# Patient Record
Sex: FEMALE | Race: WHITE | NOT HISPANIC OR LATINO | Employment: UNEMPLOYED | URBAN - NONMETROPOLITAN AREA
[De-identification: names, ages, dates, MRNs, and addresses within clinical notes are randomized per-mention and may not be internally consistent; named-entity substitution may affect disease eponyms.]

---

## 2023-12-11 ENCOUNTER — OFFICE VISIT (OUTPATIENT)
Dept: PRIMARY CARE | Facility: CLINIC | Age: 63
End: 2023-12-11
Payer: COMMERCIAL

## 2023-12-11 VITALS
HEIGHT: 66 IN | HEART RATE: 74 BPM | OXYGEN SATURATION: 98 % | BODY MASS INDEX: 23.63 KG/M2 | DIASTOLIC BLOOD PRESSURE: 60 MMHG | SYSTOLIC BLOOD PRESSURE: 124 MMHG | WEIGHT: 147 LBS

## 2023-12-11 DIAGNOSIS — Z13.6 ENCOUNTER FOR LIPID SCREENING FOR CARDIOVASCULAR DISEASE: ICD-10-CM

## 2023-12-11 DIAGNOSIS — Z13.220 ENCOUNTER FOR LIPID SCREENING FOR CARDIOVASCULAR DISEASE: ICD-10-CM

## 2023-12-11 DIAGNOSIS — D72.819 LEUKOPENIA, UNSPECIFIED TYPE: ICD-10-CM

## 2023-12-11 DIAGNOSIS — Z00.00 WELL ADULT EXAM: Primary | ICD-10-CM

## 2023-12-11 DIAGNOSIS — Z12.31 ENCOUNTER FOR SCREENING MAMMOGRAM FOR BREAST CANCER: ICD-10-CM

## 2023-12-11 DIAGNOSIS — N18.31 STAGE 3A CHRONIC KIDNEY DISEASE (MULTI): ICD-10-CM

## 2023-12-11 DIAGNOSIS — Z13.29 SCREENING FOR THYROID DISORDER: ICD-10-CM

## 2023-12-11 DIAGNOSIS — E55.9 VITAMIN D DEFICIENCY: ICD-10-CM

## 2023-12-11 PROBLEM — N28.9 RENAL IMPAIRMENT: Status: ACTIVE | Noted: 2023-12-11

## 2023-12-11 PROBLEM — R19.4 CHANGE IN BOWEL HABIT: Status: RESOLVED | Noted: 2023-12-11 | Resolved: 2023-12-11

## 2023-12-11 PROBLEM — R59.0 AXILLARY LYMPHADENOPATHY: Status: RESOLVED | Noted: 2023-12-11 | Resolved: 2023-12-11

## 2023-12-11 PROBLEM — R47.89 GARBLED SPEECH: Status: RESOLVED | Noted: 2023-12-11 | Resolved: 2023-12-11

## 2023-12-11 PROBLEM — G89.29 CHRONIC BILATERAL LOW BACK PAIN WITHOUT SCIATICA: Status: ACTIVE | Noted: 2023-12-11

## 2023-12-11 PROBLEM — R92.8 ABNORMAL MAMMOGRAM OF BOTH BREASTS: Status: RESOLVED | Noted: 2023-12-11 | Resolved: 2023-12-11

## 2023-12-11 PROBLEM — K58.9 IRRITABLE BOWEL SYNDROME: Status: ACTIVE | Noted: 2023-12-11

## 2023-12-11 PROBLEM — M54.50 CHRONIC BILATERAL LOW BACK PAIN WITHOUT SCIATICA: Status: ACTIVE | Noted: 2023-12-11

## 2023-12-11 PROBLEM — R25.2 MUSCLE CRAMPS: Status: RESOLVED | Noted: 2023-12-11 | Resolved: 2023-12-11

## 2023-12-11 LAB
25(OH)D3 SERPL-MCNC: 38 NG/ML (ref 30–100)
ALBUMIN SERPL BCP-MCNC: 4.6 G/DL (ref 3.4–5)
ALP SERPL-CCNC: 44 U/L (ref 33–136)
ALT SERPL W P-5'-P-CCNC: 9 U/L (ref 7–45)
ANION GAP SERPL CALC-SCNC: 13 MMOL/L (ref 10–20)
AST SERPL W P-5'-P-CCNC: 19 U/L (ref 9–39)
BASOPHILS # BLD AUTO: 0.02 X10*3/UL (ref 0–0.1)
BASOPHILS NFR BLD AUTO: 0.4 %
BILIRUB SERPL-MCNC: 0.7 MG/DL (ref 0–1.2)
BUN SERPL-MCNC: 11 MG/DL (ref 6–23)
CALCIUM SERPL-MCNC: 9.3 MG/DL (ref 8.6–10.3)
CHLORIDE SERPL-SCNC: 105 MMOL/L (ref 98–107)
CHOLEST SERPL-MCNC: 189 MG/DL (ref 0–199)
CHOLESTEROL/HDL RATIO: 3
CO2 SERPL-SCNC: 27 MMOL/L (ref 21–32)
CREAT SERPL-MCNC: 1.04 MG/DL (ref 0.5–1.05)
CREAT UR-MCNC: 124.8 MG/DL (ref 20–320)
EOSINOPHIL # BLD AUTO: 0.1 X10*3/UL (ref 0–0.7)
EOSINOPHIL NFR BLD AUTO: 1.9 %
ERYTHROCYTE [DISTWIDTH] IN BLOOD BY AUTOMATED COUNT: 12.4 % (ref 11.5–14.5)
GFR SERPL CREATININE-BSD FRML MDRD: 60 ML/MIN/1.73M*2
GLUCOSE SERPL-MCNC: 94 MG/DL (ref 74–99)
HCT VFR BLD AUTO: 42.8 % (ref 36–46)
HDLC SERPL-MCNC: 63.2 MG/DL
HGB BLD-MCNC: 13.6 G/DL (ref 12–16)
IMM GRANULOCYTES # BLD AUTO: 0.01 X10*3/UL (ref 0–0.7)
IMM GRANULOCYTES NFR BLD AUTO: 0.2 % (ref 0–0.9)
LDLC SERPL CALC-MCNC: 109 MG/DL
LYMPHOCYTES # BLD AUTO: 1.82 X10*3/UL (ref 1.2–4.8)
LYMPHOCYTES NFR BLD AUTO: 34.6 %
MAGNESIUM SERPL-MCNC: 1.94 MG/DL (ref 1.6–2.4)
MCH RBC QN AUTO: 30.2 PG (ref 26–34)
MCHC RBC AUTO-ENTMCNC: 31.8 G/DL (ref 32–36)
MCV RBC AUTO: 95 FL (ref 80–100)
MONOCYTES # BLD AUTO: 0.23 X10*3/UL (ref 0.1–1)
MONOCYTES NFR BLD AUTO: 4.4 %
NEUTROPHILS # BLD AUTO: 3.08 X10*3/UL (ref 1.2–7.7)
NEUTROPHILS NFR BLD AUTO: 58.5 %
NON HDL CHOLESTEROL: 126 MG/DL (ref 0–149)
NRBC BLD-RTO: 0 /100 WBCS (ref 0–0)
PLATELET # BLD AUTO: 243 X10*3/UL (ref 150–450)
POC APPEARANCE, URINE: CLEAR
POC BILIRUBIN, URINE: NEGATIVE
POC BLOOD, URINE: NEGATIVE
POC COLOR, URINE: YELLOW
POC GLUCOSE, URINE: NEGATIVE MG/DL
POC KETONES, URINE: NEGATIVE MG/DL
POC LEUKOCYTES, URINE: ABNORMAL
POC NITRITE,URINE: NEGATIVE
POC PH, URINE: 6.5 PH
POC PROTEIN, URINE: NEGATIVE MG/DL
POC SPECIFIC GRAVITY, URINE: 1.02
POC UROBILINOGEN, URINE: 0.2 EU/DL
POTASSIUM SERPL-SCNC: 4.6 MMOL/L (ref 3.5–5.3)
PROT SERPL-MCNC: 6.7 G/DL (ref 6.4–8.2)
PROT UR-ACNC: 9 MG/DL (ref 5–24)
PROT/CREAT UR: 0.07 MG/MG CREAT (ref 0–0.17)
RBC # BLD AUTO: 4.5 X10*6/UL (ref 4–5.2)
SODIUM SERPL-SCNC: 140 MMOL/L (ref 136–145)
TRIGL SERPL-MCNC: 85 MG/DL (ref 0–149)
TSH SERPL-ACNC: 2.46 MIU/L (ref 0.44–3.98)
VLDL: 17 MG/DL (ref 0–40)
WBC # BLD AUTO: 5.3 X10*3/UL (ref 4.4–11.3)

## 2023-12-11 PROCEDURE — 81003 URINALYSIS AUTO W/O SCOPE: CPT | Performed by: FAMILY MEDICINE

## 2023-12-11 PROCEDURE — 80061 LIPID PANEL: CPT

## 2023-12-11 PROCEDURE — 1036F TOBACCO NON-USER: CPT | Performed by: FAMILY MEDICINE

## 2023-12-11 PROCEDURE — 82570 ASSAY OF URINE CREATININE: CPT

## 2023-12-11 PROCEDURE — 83735 ASSAY OF MAGNESIUM: CPT

## 2023-12-11 PROCEDURE — 80053 COMPREHEN METABOLIC PANEL: CPT

## 2023-12-11 PROCEDURE — 82306 VITAMIN D 25 HYDROXY: CPT

## 2023-12-11 PROCEDURE — 85025 COMPLETE CBC W/AUTO DIFF WBC: CPT

## 2023-12-11 PROCEDURE — 36415 COLL VENOUS BLD VENIPUNCTURE: CPT

## 2023-12-11 PROCEDURE — 99396 PREV VISIT EST AGE 40-64: CPT | Performed by: FAMILY MEDICINE

## 2023-12-11 PROCEDURE — 84443 ASSAY THYROID STIM HORMONE: CPT

## 2023-12-11 PROCEDURE — 84156 ASSAY OF PROTEIN URINE: CPT

## 2023-12-11 RX ORDER — ASCORBIC ACID 500 MG
500 TABLET ORAL DAILY
COMMUNITY
Start: 2023-04-10

## 2023-12-11 RX ORDER — CALCIUM CARBONATE 300MG(750)
400 TABLET,CHEWABLE ORAL DAILY
COMMUNITY
Start: 2023-02-09

## 2023-12-11 RX ORDER — CHOLECALCIFEROL (VITAMIN D3) 25 MCG
1000 TABLET ORAL DAILY
COMMUNITY

## 2023-12-11 ASSESSMENT — ENCOUNTER SYMPTOMS
DIZZINESS: 0
DIARRHEA: 0
BACK PAIN: 0
DYSPHORIC MOOD: 0
NERVOUS/ANXIOUS: 0
COUGH: 0
EYE REDNESS: 0
MYALGIAS: 1
EYE PAIN: 0
HEADACHES: 0
HEMATURIA: 0
TREMORS: 0
WHEEZING: 0
WEAKNESS: 0
POLYPHAGIA: 0
SHORTNESS OF BREATH: 0
SORE THROAT: 0
FREQUENCY: 0
ABDOMINAL PAIN: 0
PALPITATIONS: 0
ADENOPATHY: 0
POLYDIPSIA: 0
BLOOD IN STOOL: 0
NUMBNESS: 0
BRUISES/BLEEDS EASILY: 0
CHEST TIGHTNESS: 0
DYSURIA: 0
CONSTIPATION: 0
COLOR CHANGE: 0
ARTHRALGIAS: 0
FATIGUE: 0
NAUSEA: 0
FEVER: 0
TROUBLE SWALLOWING: 0
VOMITING: 0
CHILLS: 0

## 2023-12-11 NOTE — PROGRESS NOTES
Subjective   Patient ID: Jenni Potter is a 63 y.o. female who presents for Annual Exam (Check up ).  HPI  Has a lot of pain at times  Occurs in various places  Seems to come and go    No CP, SOB, palpitations, numbness, weakness, dizziness, HA, vision changes  Will get some muscle cramps at time    Ophtho- Summer 2023  Dentist- Regularly sees  [place]-  Colonoscopy- 9/21/20- repeat 10 years  [facility]-  FOBT-  UA/Micro- ordered  Mammo- ordered  DEXA-  PAP- will contact Dr. Jason  Lung CT-  Coronary Calcium CT Score-  AAA-  EKG-  RSV-  Prevnar-  Flu-refused  Shingrix-  Td- 2010  Hep C-  Advance Directives-      Current Outpatient Medications:     ascorbic acid (Vitamin C) 500 mg tablet, Take 1 tablet (500 mg) by mouth once daily., Disp: , Rfl:     magnesium oxide (Mag-Ox) 400 mg tablet, Take 1 tablet (400 mg) by mouth once daily., Disp: , Rfl:     BACILLUS COAGULANS-INULIN ORAL, Take 1 capsule by mouth once daily., Disp: , Rfl:     cholecalciferol (Vitamin D-3) 25 MCG (1000 UT) tablet, Take 1 tablet (1,000 Units) by mouth once daily., Disp: , Rfl:    Past Surgical History:   Procedure Laterality Date    BREAST LUMPECTOMY  06/18/2013    Breast Surgery Lumpectomy    COLONOSCOPY  02/02/2017    Complete Colonoscopy      Past Medical History:   Diagnosis Date    Axillary lymphadenopathy 12/11/2023    Change in bowel habit 12/11/2023    Dysuria 10/22/2015    Dysuria    Encounter for gynecological examination (general) (routine) without abnormal findings     Encounter for routine pelvic examination    Garbled speech 12/11/2023    Low back pain, unspecified 02/02/2017    Acute low back pain    Lumbago with sciatica, left side 02/02/2017    Acute left-sided low back pain with left-sided sciatica    Other injury of unspecified body region, initial encounter 06/09/2016    Wound, open    Other shoulder lesions, left shoulder 12/30/2016    Tendinitis of left rotator cuff    Pain in left shoulder 12/30/2016    Acute pain of left  "shoulder    Personal history of urinary (tract) infections 01/14/2017    History of acute cystitis     Social History     Tobacco Use    Smoking status: Never    Smokeless tobacco: Never   Substance Use Topics    Drug use: Never      No family history on file.   Review of Systems   Constitutional:  Negative for chills, fatigue and fever.   HENT:  Negative for congestion, ear discharge, ear pain, hearing loss, nosebleeds, sore throat, tinnitus and trouble swallowing.    Eyes:  Negative for pain, redness and visual disturbance.   Respiratory:  Negative for cough, chest tightness, shortness of breath and wheezing.    Cardiovascular:  Negative for chest pain, palpitations and leg swelling.   Gastrointestinal:  Negative for abdominal pain, blood in stool, constipation, diarrhea, nausea and vomiting.   Endocrine: Negative for cold intolerance, heat intolerance, polydipsia, polyphagia and polyuria.   Genitourinary:  Negative for dysuria, frequency, hematuria and urgency.   Musculoskeletal:  Positive for myalgias. Negative for arthralgias, back pain and gait problem.   Skin:  Negative for color change and rash.   Neurological:  Negative for dizziness, tremors, syncope, weakness, numbness and headaches.   Hematological:  Negative for adenopathy. Does not bruise/bleed easily.   Psychiatric/Behavioral:  Negative for dysphoric mood. The patient is not nervous/anxious.        Objective   /60   Pulse 74   Ht 1.676 m (5' 6\")   Wt 66.7 kg (147 lb)   SpO2 98%   BMI 23.73 kg/m²    Physical Exam  Vitals and nursing note reviewed.   Constitutional:       General: She is not in acute distress.     Appearance: Normal appearance.   HENT:      Head: Normocephalic and atraumatic.      Right Ear: Tympanic membrane, ear canal and external ear normal.      Left Ear: Tympanic membrane, ear canal and external ear normal.      Nose: Nose normal.      Mouth/Throat:      Mouth: Mucous membranes are moist.      Pharynx: Oropharynx is " clear.   Eyes:      Extraocular Movements: Extraocular movements intact.      Conjunctiva/sclera: Conjunctivae normal.      Pupils: Pupils are equal, round, and reactive to light.   Neck:      Vascular: No carotid bruit.   Cardiovascular:      Rate and Rhythm: Normal rate and regular rhythm.      Pulses: Normal pulses.      Heart sounds: Normal heart sounds. No murmur heard.  Pulmonary:      Effort: Pulmonary effort is normal.      Breath sounds: Normal breath sounds. No wheezing, rhonchi or rales.   Abdominal:      General: Abdomen is flat. Bowel sounds are normal.      Palpations: Abdomen is soft. There is no mass.   Musculoskeletal:         General: Normal range of motion.      Cervical back: Normal range of motion and neck supple.   Lymphadenopathy:      Cervical: No cervical adenopathy.   Skin:     Capillary Refill: Capillary refill takes less than 2 seconds.   Neurological:      General: No focal deficit present.      Mental Status: She is alert and oriented to person, place, and time.      Cranial Nerves: No cranial nerve deficit.      Motor: No weakness.      Deep Tendon Reflexes: Reflexes normal.   Psychiatric:         Mood and Affect: Mood normal.         Behavior: Behavior normal.         Assessment/Plan   Problem List Items Addressed This Visit       Leukopenia    Relevant Orders    CBC and Auto Differential (Completed)    Comprehensive Metabolic Panel (Completed)    Stage 3a chronic kidney disease (CMS/HCC)    Relevant Orders    Comprehensive Metabolic Panel (Completed)    POCT UA Automated manually resulted (Completed)    Protein, Urine Random (Completed)    Magnesium (Completed)    Vitamin D deficiency    Relevant Orders    Comprehensive Metabolic Panel (Completed)    Vitamin D 25-Hydroxy,Total (for eval of Vitamin D levels) (Completed)     Other Visit Diagnoses       Well adult exam    -  Primary    Encounter for lipid screening for cardiovascular disease        Relevant Orders    Lipid Panel  (Completed)    Screening for thyroid disorder        Relevant Orders    TSH with reflex to Free T4 if abnormal (Completed)    Encounter for screening mammogram for breast cancer        Relevant Orders    BI mammo bilateral screening tomosynthesis            Patient understands and agrees with treatment plan    Christopher Orellana, DO

## 2024-01-09 ENCOUNTER — DOCUMENTATION (OUTPATIENT)
Dept: SURGERY | Facility: CLINIC | Age: 64
End: 2024-01-09
Payer: COMMERCIAL

## 2025-03-01 ENCOUNTER — OFFICE VISIT (OUTPATIENT)
Dept: URGENT CARE | Age: 65
End: 2025-03-01
Payer: MEDICARE

## 2025-03-01 ENCOUNTER — HOSPITAL ENCOUNTER (EMERGENCY)
Facility: HOSPITAL | Age: 65
Discharge: HOME | End: 2025-03-01
Attending: EMERGENCY MEDICINE
Payer: MEDICARE

## 2025-03-01 VITALS
SYSTOLIC BLOOD PRESSURE: 121 MMHG | DIASTOLIC BLOOD PRESSURE: 81 MMHG | HEART RATE: 81 BPM | RESPIRATION RATE: 19 BRPM | BODY MASS INDEX: 24.21 KG/M2 | TEMPERATURE: 96.9 F | WEIGHT: 150 LBS | OXYGEN SATURATION: 98 %

## 2025-03-01 VITALS
RESPIRATION RATE: 16 BRPM | BODY MASS INDEX: 23.54 KG/M2 | TEMPERATURE: 97 F | OXYGEN SATURATION: 99 % | WEIGHT: 150 LBS | HEIGHT: 67 IN | DIASTOLIC BLOOD PRESSURE: 73 MMHG | SYSTOLIC BLOOD PRESSURE: 128 MMHG | HEART RATE: 63 BPM

## 2025-03-01 DIAGNOSIS — H10.31 ACUTE CONJUNCTIVITIS OF RIGHT EYE, UNSPECIFIED ACUTE CONJUNCTIVITIS TYPE: Primary | ICD-10-CM

## 2025-03-01 DIAGNOSIS — R21 RASH: ICD-10-CM

## 2025-03-01 DIAGNOSIS — H57.11 ACUTE RIGHT EYE PAIN: Primary | ICD-10-CM

## 2025-03-01 PROCEDURE — 2500000005 HC RX 250 GENERAL PHARMACY W/O HCPCS

## 2025-03-01 PROCEDURE — 99283 EMERGENCY DEPT VISIT LOW MDM: CPT | Performed by: EMERGENCY MEDICINE

## 2025-03-01 PROCEDURE — 99284 EMERGENCY DEPT VISIT MOD MDM: CPT

## 2025-03-01 RX ORDER — TETRACAINE HYDROCHLORIDE 5 MG/ML
1 SOLUTION OPHTHALMIC ONCE
Status: COMPLETED | OUTPATIENT
Start: 2025-03-01 | End: 2025-03-01

## 2025-03-01 RX ORDER — ERYTHROMYCIN 5 MG/G
OINTMENT OPHTHALMIC NIGHTLY
Qty: 10 G | Refills: 0 | Status: SHIPPED | OUTPATIENT
Start: 2025-03-01 | End: 2025-03-06

## 2025-03-01 RX ADMIN — FLUORESCEIN SODIUM 1 STRIP: 1 STRIP OPHTHALMIC at 17:18

## 2025-03-01 RX ADMIN — TETRACAINE HYDROCHLORIDE 1 DROP: 5 SOLUTION OPHTHALMIC at 17:18

## 2025-03-01 ASSESSMENT — LIFESTYLE VARIABLES
HAVE YOU EVER FELT YOU SHOULD CUT DOWN ON YOUR DRINKING: NO
EVER FELT BAD OR GUILTY ABOUT YOUR DRINKING: NO
EVER HAD A DRINK FIRST THING IN THE MORNING TO STEADY YOUR NERVES TO GET RID OF A HANGOVER: NO
TOTAL SCORE: 0
HAVE PEOPLE ANNOYED YOU BY CRITICIZING YOUR DRINKING: NO

## 2025-03-01 ASSESSMENT — VISUAL ACUITY
OD_CC: 20/20
OS_CC: 20/13
OU: 1

## 2025-03-01 ASSESSMENT — PAIN - FUNCTIONAL ASSESSMENT: PAIN_FUNCTIONAL_ASSESSMENT: 0-10

## 2025-03-01 ASSESSMENT — COLUMBIA-SUICIDE SEVERITY RATING SCALE - C-SSRS
2. HAVE YOU ACTUALLY HAD ANY THOUGHTS OF KILLING YOURSELF?: NO
6. HAVE YOU EVER DONE ANYTHING, STARTED TO DO ANYTHING, OR PREPARED TO DO ANYTHING TO END YOUR LIFE?: NO
1. IN THE PAST MONTH, HAVE YOU WISHED YOU WERE DEAD OR WISHED YOU COULD GO TO SLEEP AND NOT WAKE UP?: NO

## 2025-03-01 ASSESSMENT — PAIN SCALES - GENERAL: PAINLEVEL_OUTOF10: 1

## 2025-03-01 NOTE — DISCHARGE INSTRUCTIONS
Believe most likely you are experiencing pinkeye in the right eye at this time.  Given your history we are giving you antibiotic drops to prevent any bacterial infection from developing.  Watch for any new signs or worsening symptoms.

## 2025-03-01 NOTE — ED TRIAGE NOTES
Pt presents to ED from a [facility] with possible shingles in her right eye. Pt reports itching, swelling, rash to lower lid, HA and light sensitivity. Onset on Friday

## 2025-03-01 NOTE — PROGRESS NOTES
Subjective   Patient ID: Jenni Potter is a 64 y.o. female. They present today with a chief complaint of Eye Problem (Started Friday, eye itchiness, and redness, she believes it may be shingles.).    History of Present Illness  Reports history of having shingles in left eye and is concerned that she has shingles, symptoms are similar.  Has few lesions on forehead that she picked because she thought were pimples.  Reports that previously she did have few lesions under right eye.  Denies vision changes, no drainage, states eye and eyelid feels uncomfortable      Eye Problem      Past Medical History  Allergies as of 03/01/2025    (No Known Allergies)       (Not in a hospital admission)       Past Medical History:   Diagnosis Date    Axillary lymphadenopathy 12/11/2023    Change in bowel habit 12/11/2023    Dysuria 10/22/2015    Dysuria    Encounter for gynecological examination (general) (routine) without abnormal findings     Encounter for routine pelvic examination    Garbled speech 12/11/2023    Low back pain, unspecified 02/02/2017    Acute low back pain    Lumbago with sciatica, left side 02/02/2017    Acute left-sided low back pain with left-sided sciatica    Other injury of unspecified body region, initial encounter 06/09/2016    Wound, open    Other shoulder lesions, left shoulder 12/30/2016    Tendinitis of left rotator cuff    Pain in left shoulder 12/30/2016    Acute pain of left shoulder    Personal history of urinary (tract) infections 01/14/2017    History of acute cystitis       Past Surgical History:   Procedure Laterality Date    BREAST LUMPECTOMY  06/18/2013    Breast Surgery Lumpectomy    COLONOSCOPY  02/02/2017    Complete Colonoscopy        reports that she has never smoked. She has never used smokeless tobacco. She reports that she does not use drugs.    Review of Systems  Review of Systems                               Objective    Vitals:    03/01/25 1533   BP: 121/81   BP Location: Left arm    Patient Position: Sitting   BP Cuff Size: Small adult   Pulse: 81   Resp: 19   Temp: 36.1 °C (96.9 °F)   TempSrc: Oral   SpO2: 98%   Weight: 68 kg (150 lb)     No LMP recorded.    Physical Exam  Constitutional:       Appearance: Normal appearance.   Eyes:      General: Vision grossly intact. Gaze aligned appropriately.      Extraocular Movements: Extraocular movements intact.      Conjunctiva/sclera:      Right eye: Right conjunctiva is injected. No exudate.     Pupils: Pupils are equal, round, and reactive to light.   Cardiovascular:      Rate and Rhythm: Normal rate and regular rhythm.      Pulses: Normal pulses.      Heart sounds: Normal heart sounds.   Pulmonary:      Effort: Pulmonary effort is normal.      Breath sounds: Normal breath sounds.   Skin:     Findings: Rash (3 sm papular lesions right forehead, sl swollen right eyelid) present.   Neurological:      General: No focal deficit present.      Mental Status: She is oriented to person, place, and time.   Psychiatric:         Mood and Affect: Mood normal.         Behavior: Behavior normal.         Thought Content: Thought content normal.         Judgment: Judgment normal.         Procedures    Point of Care Test & Imaging Results from this visit  No results found for this visit on 03/01/25.   No results found.    Diagnostic study results (if any) were reviewed by JOE Clancy.    Assessment/Plan   Allergies, medications, history, and pertinent labs/EKGs/Imaging reviewed by JOE Clancy.     Medical Decision Making  Right eye irritation, few scratched lesions right forehead, pt has history of shingles in left eye and is concerned that she has shingles in right eye, symptoms similar.    No vision changes  Advised that she needs Urgent Ophthalmology evaluation, since it is the weekend the only option is for her to go to [facility] Emergency room.  Dr Gutierrez notified     Orders and Diagnoses  Diagnoses and all orders for this  visit:  Acute right eye pain  Rash      Medical Admin Record      Patient disposition: ED    Electronically signed by JOE Clancy  4:06 PM

## 2025-03-01 NOTE — ED PROVIDER NOTES
HPI   Chief Complaint   Patient presents with    Eye Problem       64-year-old female past medical history CKD stage III, ocular shingles presents emergency department chief complaints of right eye issue.  Patient reports pruritus, edema, rash to lower right eyelid, headache, light sensitivity beginning yesterday.  Patient reports that she has a history of shingles in the left eye and states that her symptoms currently feel similar to her previous episode.  Denies ear pain, ear drainage, nausea, vomiting, chest pain, shortness of breath, abdominal pain, acute vision changes, pain with extraocular movements, eye discharge.              Patient History   Past Medical History:   Diagnosis Date    Axillary lymphadenopathy 12/11/2023    Change in bowel habit 12/11/2023    Dysuria 10/22/2015    Dysuria    Encounter for gynecological examination (general) (routine) without abnormal findings     Encounter for routine pelvic examination    Garbled speech 12/11/2023    Low back pain, unspecified 02/02/2017    Acute low back pain    Lumbago with sciatica, left side 02/02/2017    Acute left-sided low back pain with left-sided sciatica    Other injury of unspecified body region, initial encounter 06/09/2016    Wound, open    Other shoulder lesions, left shoulder 12/30/2016    Tendinitis of left rotator cuff    Pain in left shoulder 12/30/2016    Acute pain of left shoulder    Personal history of urinary (tract) infections 01/14/2017    History of acute cystitis     Past Surgical History:   Procedure Laterality Date    BREAST LUMPECTOMY  06/18/2013    Breast Surgery Lumpectomy    COLONOSCOPY  02/02/2017    Complete Colonoscopy     No family history on file.  Social History     Tobacco Use    Smoking status: Never    Smokeless tobacco: Never   Substance Use Topics    Alcohol use: Not on file     Comment: social    Drug use: Never       Physical Exam   ED Triage Vitals [03/01/25 1654]   Temperature Heart Rate Respirations BP   36.1  °C (97 °F) 63 16 128/73      Pulse Ox Temp Source Heart Rate Source Patient Position   99 % Temporal Monitor Sitting      BP Location FiO2 (%)     Left arm --       Physical Exam  Vitals and nursing note reviewed.   Constitutional:       General: She is not in acute distress.     Appearance: Normal appearance. She is normal weight. She is not ill-appearing, toxic-appearing or diaphoretic.   HENT:      Head: Normocephalic and atraumatic.      Right Ear: Tympanic membrane, ear canal and external ear normal. There is no impacted cerumen.      Left Ear: Tympanic membrane, ear canal and external ear normal. There is no impacted cerumen.      Nose:      Comments: No lesions noted on nose.  Eyes:      General: No scleral icterus.        Right eye: No discharge.         Left eye: No discharge.      Extraocular Movements: Extraocular movements intact.      Pupils: Pupils are equal, round, and reactive to light.      Comments: Possible very slight right eye conjunctival injection.  No purulent drainage noted.  No pain with extraocular movements.    Fluorescein stain completed at bedside with no appreciated lesions, corneal abrasions in the right eye.  Extraocular movements intact.  Pupils equal round and reactive.   Cardiovascular:      Rate and Rhythm: Normal rate and regular rhythm.      Heart sounds: Normal heart sounds. No murmur heard.     No friction rub. No gallop.   Pulmonary:      Effort: Pulmonary effort is normal. No respiratory distress.      Breath sounds: Normal breath sounds. No stridor. No wheezing, rhonchi or rales.   Neurological:      Mental Status: She is alert.           ED Course & MDM   Diagnoses as of 03/01/25 1740   Acute conjunctivitis of right eye, unspecified acute conjunctivitis type                 No data recorded     Vincent Coma Scale Score: 15 (03/01/25 1654 : Heather Montanez RN)                           Medical Decision Making  64-year-old female presents emergency department chief complaint  of right eye problem.  Patient reportedly has history of shingles in the left eye and states that she has been having some pruritus, reported rash to the lower eyelid, eyelid swelling, headache and light sensitivity for the past couple days.  Patient states that her symptoms now feel similar but now in the right eye.  Patient does however report that she was having some slight URI type symptoms within the last couple days as well as associated slight headache.    5:41 PM patient case was discussed and staffed with attending physician who did agree with my assessment.  Patient has no distinct vesicular lesions visualized around the eye, nose or on the scalp.  Patient does have slight conjunctival injection of the right eye at this time but no distinct visualized lesions on fluorescein exam.  Based on patient's clinical presentation at this time believe patient likely experiencing viral conjunctivitis and less suggestive of ocular shingles at this time.  Patient does not have any significant blepharitis, periocular erythema or clinical signs symptoms to suggest shingles etiology at this time.  Given patient's medical history did prescribe erythromycin ointment as prophylaxis to prevent superimposed bacterial infection.  Patient does not wear contacts at this time.  Spoke with patient regarding strict signs and symptoms of return as well as symptomatic management moving forward and they did show understanding.  Patient did have opportunity ask questions and had them answered and had no further complaints at this time and was amenable to plan moving forward for discharge.         Procedure  Procedures     Erasmo Cason PA-C  03/01/25 2038

## 2025-03-01 NOTE — PATIENT INSTRUCTIONS
Right eye irritation, few scratched lesions right forehead, pt has history of shingles in left eye and is concerned that she has shingles in right eye, symptoms similar.    No vision changes  Advised that she needs Urgent Ophthalmology evaluation, since it is the weekend the only option is for her to go to [facility] Emergency room.  Dr Gutierrez notified

## 2025-03-24 ENCOUNTER — APPOINTMENT (OUTPATIENT)
Dept: PRIMARY CARE | Facility: CLINIC | Age: 65
End: 2025-03-24
Payer: MEDICARE

## 2025-03-24 VITALS
SYSTOLIC BLOOD PRESSURE: 122 MMHG | DIASTOLIC BLOOD PRESSURE: 60 MMHG | WEIGHT: 153 LBS | HEART RATE: 70 BPM | HEIGHT: 67 IN | BODY MASS INDEX: 24.01 KG/M2 | OXYGEN SATURATION: 99 %

## 2025-03-24 DIAGNOSIS — R53.83 FATIGUE, UNSPECIFIED TYPE: ICD-10-CM

## 2025-03-24 DIAGNOSIS — K58.9 IRRITABLE BOWEL SYNDROME, UNSPECIFIED TYPE: ICD-10-CM

## 2025-03-24 DIAGNOSIS — Z12.31 ENCOUNTER FOR SCREENING MAMMOGRAM FOR BREAST CANCER: ICD-10-CM

## 2025-03-24 DIAGNOSIS — N18.31 STAGE 3A CHRONIC KIDNEY DISEASE (MULTI): ICD-10-CM

## 2025-03-24 DIAGNOSIS — Z13.220 ENCOUNTER FOR LIPID SCREENING FOR CARDIOVASCULAR DISEASE: ICD-10-CM

## 2025-03-24 DIAGNOSIS — Z00.00 ROUTINE GENERAL MEDICAL EXAMINATION AT HEALTH CARE FACILITY: Primary | ICD-10-CM

## 2025-03-24 DIAGNOSIS — Z13.6 ENCOUNTER FOR LIPID SCREENING FOR CARDIOVASCULAR DISEASE: ICD-10-CM

## 2025-03-24 DIAGNOSIS — D72.819 LEUKOPENIA, UNSPECIFIED TYPE: ICD-10-CM

## 2025-03-24 DIAGNOSIS — E55.9 VITAMIN D DEFICIENCY: ICD-10-CM

## 2025-03-24 DIAGNOSIS — Z71.89 CARDIAC RISK COUNSELING: ICD-10-CM

## 2025-03-24 DIAGNOSIS — Z78.0 MENOPAUSE: ICD-10-CM

## 2025-03-24 DIAGNOSIS — Z13.29 SCREENING FOR THYROID DISORDER: ICD-10-CM

## 2025-03-24 PROCEDURE — G0438 PPPS, INITIAL VISIT: HCPCS | Performed by: FAMILY MEDICINE

## 2025-03-24 PROCEDURE — 1159F MED LIST DOCD IN RCRD: CPT | Performed by: FAMILY MEDICINE

## 2025-03-24 PROCEDURE — 3008F BODY MASS INDEX DOCD: CPT | Performed by: FAMILY MEDICINE

## 2025-03-24 PROCEDURE — 1160F RVW MEDS BY RX/DR IN RCRD: CPT | Performed by: FAMILY MEDICINE

## 2025-03-24 PROCEDURE — 1036F TOBACCO NON-USER: CPT | Performed by: FAMILY MEDICINE

## 2025-03-24 PROCEDURE — G2211 COMPLEX E/M VISIT ADD ON: HCPCS | Performed by: FAMILY MEDICINE

## 2025-03-24 PROCEDURE — G0446 INTENS BEHAVE THER CARDIO DX: HCPCS | Performed by: FAMILY MEDICINE

## 2025-03-24 PROCEDURE — 99214 OFFICE O/P EST MOD 30 MIN: CPT | Performed by: FAMILY MEDICINE

## 2025-03-24 ASSESSMENT — ENCOUNTER SYMPTOMS
ARTHRALGIAS: 0
FEVER: 0
VOMITING: 0
DYSURIA: 0
LOSS OF SENSATION IN FEET: 0
HEADACHES: 0
PALPITATIONS: 0
HEMATURIA: 0
MYALGIAS: 0
CHILLS: 0
SHORTNESS OF BREATH: 0
EYE REDNESS: 0
BLOOD IN STOOL: 0
POLYDIPSIA: 0
DYSPHORIC MOOD: 0
DEPRESSION: 0
BACK PAIN: 0
SORE THROAT: 0
DIARRHEA: 0
TROUBLE SWALLOWING: 0
ABDOMINAL PAIN: 0
NUMBNESS: 0
BRUISES/BLEEDS EASILY: 0
TREMORS: 0
ADENOPATHY: 0
WEAKNESS: 0
EYE PAIN: 0
FATIGUE: 0
DIZZINESS: 0
COLOR CHANGE: 0
FREQUENCY: 0
CONSTIPATION: 0
NAUSEA: 0
COUGH: 0
WHEEZING: 0
OCCASIONAL FEELINGS OF UNSTEADINESS: 0
NERVOUS/ANXIOUS: 0
POLYPHAGIA: 0
CHEST TIGHTNESS: 0

## 2025-03-24 ASSESSMENT — PATIENT HEALTH QUESTIONNAIRE - PHQ9
1. LITTLE INTEREST OR PLEASURE IN DOING THINGS: NOT AT ALL
SUM OF ALL RESPONSES TO PHQ9 QUESTIONS 1 & 2: 0
2. FEELING DOWN, DEPRESSED OR HOPELESS: NOT AT ALL

## 2025-03-24 NOTE — PROGRESS NOTES
Subjective   Reason for Visit: Jenni Potter is an 64 y.o. female here for a Medicare Wellness visit.     Past Medical, Surgical, and Family History reviewed and updated in chart.    Reviewed all medications by prescribing practitioner or clinical pharmacist (such as prescriptions, OTCs, herbal therapies and supplements) and documented in the medical record.    HPI  No CP, SOB, palpitations, numbness, weakness, dizziness, HA, vision changes  Had ocular shingles     Ophtho- Summer 2023  Dentist- Regularly sees  [place]-  Colonoscopy- 9/21/20- repeat 10 years  [facility]-  FOBT-  UA/Micro- ordered  Mammo- ordered  DEXA-  PAP- will contact Dr. Jaosn  Lung CT-  Coronary Calcium CT Score-  AAA-  EKG-  RSV- refused  Prevnar- refused  Flu-refused  Shingrix-   Td- 2010  Hep C-  Advance Directives-  AWV- 3/24/25  MDD screen- 3/24/25  ETOH screen- 3/24/25  CV risk- 3/24/25    Patient Care Team:  Christopher Orellana DO as PCP - General  Usman tSeve MD as Consulting Physician (Neurology)  Angela Cervantes MD as Surgeon (General Surgery)  Massiel Carballo MD as Surgeon (Gastroenterology)     Review of Systems   Constitutional:  Negative for chills, fatigue and fever.   HENT:  Negative for congestion, ear discharge, ear pain, hearing loss, nosebleeds, sore throat, tinnitus and trouble swallowing.    Eyes:  Negative for pain, redness and visual disturbance.   Respiratory:  Negative for cough, chest tightness, shortness of breath and wheezing.    Cardiovascular:  Negative for chest pain, palpitations and leg swelling.   Gastrointestinal:  Negative for abdominal pain, blood in stool, constipation, diarrhea, nausea and vomiting.   Endocrine: Negative for cold intolerance, heat intolerance, polydipsia, polyphagia and polyuria.   Genitourinary:  Negative for dysuria, frequency, hematuria and urgency.   Musculoskeletal:  Negative for arthralgias, back pain, gait problem and myalgias.   Skin:  Negative for color change and rash.   Neurological:   "Negative for dizziness, tremors, syncope, weakness, numbness and headaches.   Hematological:  Negative for adenopathy. Does not bruise/bleed easily.   Psychiatric/Behavioral:  Negative for dysphoric mood. The patient is not nervous/anxious.        Objective   Vitals:  /60   Pulse 70   Ht 1.702 m (5' 7\")   Wt 69.4 kg (153 lb)   SpO2 99%   BMI 23.96 kg/m²       Physical Exam  Vitals and nursing note reviewed.   Constitutional:       General: She is not in acute distress.     Appearance: Normal appearance.   HENT:      Head: Normocephalic and atraumatic.      Right Ear: Tympanic membrane, ear canal and external ear normal.      Left Ear: Tympanic membrane, ear canal and external ear normal.      Nose: Nose normal.      Mouth/Throat:      Mouth: Mucous membranes are moist.      Pharynx: Oropharynx is clear.   Eyes:      Extraocular Movements: Extraocular movements intact.      Conjunctiva/sclera: Conjunctivae normal.      Pupils: Pupils are equal, round, and reactive to light.   Neck:      Vascular: No carotid bruit.   Cardiovascular:      Rate and Rhythm: Normal rate and regular rhythm.      Pulses: Normal pulses.      Heart sounds: Normal heart sounds. No murmur heard.  Pulmonary:      Effort: Pulmonary effort is normal.      Breath sounds: Normal breath sounds. No wheezing, rhonchi or rales.   Abdominal:      General: Abdomen is flat. Bowel sounds are normal.      Palpations: Abdomen is soft. There is no mass.   Musculoskeletal:         General: Normal range of motion.      Cervical back: Normal range of motion and neck supple.   Lymphadenopathy:      Cervical: No cervical adenopathy.   Skin:     Capillary Refill: Capillary refill takes less than 2 seconds.   Neurological:      General: No focal deficit present.      Mental Status: She is alert and oriented to person, place, and time.      Cranial Nerves: No cranial nerve deficit.      Motor: No weakness.      Deep Tendon Reflexes: Reflexes normal. "   Psychiatric:         Mood and Affect: Mood normal.         Behavior: Behavior normal.         Assessment & Plan  Routine general medical examination at health care facility         Stage 3a chronic kidney disease (Multi)    Orders:    Comprehensive Metabolic Panel    Protein, Urine Random    Irritable bowel syndrome, unspecified type         Vitamin D deficiency    Orders:    Vitamin D 25-Hydroxy,Total (for eval of Vitamin D levels)    Cardiac risk counseling         Leukopenia, unspecified type    Orders:    CBC and Auto Differential    Encounter for lipid screening for cardiovascular disease    Orders:    Lipid Panel    Screening for thyroid disorder         Encounter for screening mammogram for breast cancer    Orders:    BI mammo bilateral screening tomosynthesis; Future    Menopause    Orders:    XR DEXA bone density; Future    Fatigue, unspecified type    Orders:    TSH with reflex to Free T4 if abnormal       Renewed/continued rest of medications  Checked  labs  Updated Health Maintenance in HPI section    CKD- avoid NSAID's, 6 glasses clear fluid a day    IBS- probiotic, avoid trigger foods    Vitamin D Def-  supplement, follow level    Leukopenia- follow CBC, well balanced diet      Cardiac Risk Assessment  5 minutes were spent discussing Cardiovascular risk (5.1%) and, if needed, lifestyle modifications were recommended, including nutritional choices, exercise, and elimination of habits contributing to risk.   Aspirin use/disuse was discussed following the guidelines below:  low dose ASA ( mg) should be considered:    If prior Heart Attack/Stroke/Peripheral vascular disease:  Generally recommend daily low dose aspirin unless extremely high bleeding risk (e.g., gastrointestinal).    If no prior Heart Attack/Stroke/Peripheral vascular disease:              Age over 70: Do not use Aspirin for prevention    Age less than 70 and 10-year cardiovascular disease risk is >20%: use low dose Aspirin for  prevention.

## 2025-03-25 LAB
ALBUMIN SERPL-MCNC: 4.8 G/DL (ref 3.6–5.1)
ALP SERPL-CCNC: 45 U/L (ref 37–153)
ALT SERPL-CCNC: 9 U/L (ref 6–29)
ANION GAP SERPL CALCULATED.4IONS-SCNC: 10 MMOL/L (CALC) (ref 7–17)
AST SERPL-CCNC: 20 U/L (ref 10–35)
BASOPHILS # BLD AUTO: 30 CELLS/UL (ref 0–200)
BASOPHILS NFR BLD AUTO: 0.5 %
BILIRUB SERPL-MCNC: 0.5 MG/DL (ref 0.2–1.2)
BUN SERPL-MCNC: 14 MG/DL (ref 7–25)
CALCIUM SERPL-MCNC: 9.6 MG/DL (ref 8.6–10.4)
CHLORIDE SERPL-SCNC: 103 MMOL/L (ref 98–110)
CHOLEST SERPL-MCNC: 205 MG/DL
CHOLEST/HDLC SERPL: 3 (CALC)
CO2 SERPL-SCNC: 26 MMOL/L (ref 20–32)
CREAT SERPL-MCNC: 1.11 MG/DL (ref 0.5–1.05)
CREAT UR-MCNC: NORMAL MG/DL
EGFRCR SERPLBLD CKD-EPI 2021: 55 ML/MIN/1.73M2
EOSINOPHIL # BLD AUTO: 102 CELLS/UL (ref 15–500)
EOSINOPHIL NFR BLD AUTO: 1.7 %
ERYTHROCYTE [DISTWIDTH] IN BLOOD BY AUTOMATED COUNT: 13 % (ref 11–15)
GLUCOSE SERPL-MCNC: 98 MG/DL (ref 65–99)
HCT VFR BLD AUTO: 40.4 % (ref 35–45)
HDLC SERPL-MCNC: 69 MG/DL
HGB BLD-MCNC: 13.4 G/DL (ref 11.7–15.5)
LDLC SERPL CALC-MCNC: 116 MG/DL (CALC)
LYMPHOCYTES # BLD AUTO: 1896 CELLS/UL (ref 850–3900)
LYMPHOCYTES NFR BLD AUTO: 31.6 %
MCH RBC QN AUTO: 31.2 PG (ref 27–33)
MCHC RBC AUTO-ENTMCNC: 33.2 G/DL (ref 32–36)
MCV RBC AUTO: 94.2 FL (ref 80–100)
MONOCYTES # BLD AUTO: 258 CELLS/UL (ref 200–950)
MONOCYTES NFR BLD AUTO: 4.3 %
NEUTROPHILS # BLD AUTO: 3714 CELLS/UL (ref 1500–7800)
NEUTROPHILS NFR BLD AUTO: 61.9 %
NONHDLC SERPL-MCNC: 136 MG/DL (CALC)
PLATELET # BLD AUTO: 242 THOUSAND/UL (ref 140–400)
PMV BLD REES-ECKER: 11.3 FL (ref 7.5–12.5)
POTASSIUM SERPL-SCNC: 4.3 MMOL/L (ref 3.5–5.3)
PROT SERPL-MCNC: 7.1 G/DL (ref 6.1–8.1)
RBC # BLD AUTO: 4.29 MILLION/UL (ref 3.8–5.1)
SODIUM SERPL-SCNC: 139 MMOL/L (ref 135–146)
TRIGL SERPL-MCNC: 94 MG/DL
WBC # BLD AUTO: 6 THOUSAND/UL (ref 3.8–10.8)

## 2025-03-26 LAB
25(OH)D3+25(OH)D2 SERPL-MCNC: 62 NG/ML (ref 30–100)
TSH SERPL-ACNC: 2.54 MIU/L (ref 0.4–4.5)